# Patient Record
Sex: MALE | Race: WHITE | NOT HISPANIC OR LATINO | Employment: FULL TIME | RURAL
[De-identification: names, ages, dates, MRNs, and addresses within clinical notes are randomized per-mention and may not be internally consistent; named-entity substitution may affect disease eponyms.]

---

## 2020-08-25 ENCOUNTER — HISTORICAL (OUTPATIENT)
Dept: ADMINISTRATIVE | Facility: HOSPITAL | Age: 75
End: 2020-08-25

## 2020-08-25 LAB
GLUCOSE SERPL-MCNC: 103 MG/DL (ref 70–105)
GLUCOSE SERPL-MCNC: 97 MG/DL (ref 70–105)

## 2021-01-11 ENCOUNTER — HISTORICAL (OUTPATIENT)
Dept: ADMINISTRATIVE | Facility: HOSPITAL | Age: 76
End: 2021-01-11

## 2021-09-29 ENCOUNTER — OFFICE VISIT (OUTPATIENT)
Dept: UROLOGY | Facility: CLINIC | Age: 76
End: 2021-09-29
Payer: MEDICARE

## 2021-09-29 VITALS
WEIGHT: 211 LBS | SYSTOLIC BLOOD PRESSURE: 130 MMHG | DIASTOLIC BLOOD PRESSURE: 71 MMHG | HEART RATE: 74 BPM | HEIGHT: 72 IN | BODY MASS INDEX: 28.58 KG/M2

## 2021-09-29 DIAGNOSIS — N32.0 BLADDER OUTLET OBSTRUCTION: Primary | ICD-10-CM

## 2021-09-29 DIAGNOSIS — N13.8 BENIGN PROSTATIC HYPERPLASIA WITH URINARY OBSTRUCTION: ICD-10-CM

## 2021-09-29 DIAGNOSIS — N41.1 CHRONIC PROSTATITIS: ICD-10-CM

## 2021-09-29 DIAGNOSIS — N40.1 BENIGN PROSTATIC HYPERPLASIA WITH URINARY OBSTRUCTION: ICD-10-CM

## 2021-09-29 DIAGNOSIS — Z12.5 SCREENING PSA (PROSTATE SPECIFIC ANTIGEN): ICD-10-CM

## 2021-09-29 PROCEDURE — 99213 OFFICE O/P EST LOW 20 MIN: CPT | Mod: S$PBB,,, | Performed by: UROLOGY

## 2021-09-29 PROCEDURE — 99213 PR OFFICE/OUTPT VISIT, EST, LEVL III, 20-29 MIN: ICD-10-PCS | Mod: S$PBB,,, | Performed by: UROLOGY

## 2021-09-29 PROCEDURE — 99214 OFFICE O/P EST MOD 30 MIN: CPT | Mod: PBBFAC | Performed by: UROLOGY

## 2021-09-29 RX ORDER — TAMSULOSIN HYDROCHLORIDE 0.4 MG/1
0.4 CAPSULE ORAL EVERY 12 HOURS
Qty: 180 CAPSULE | Refills: 3 | Status: SHIPPED | OUTPATIENT
Start: 2021-09-29 | End: 2022-09-30 | Stop reason: SDUPTHER

## 2021-09-29 RX ORDER — LISINOPRIL 20 MG/1
20 TABLET ORAL DAILY
COMMUNITY
Start: 2021-08-05

## 2021-09-29 RX ORDER — MELOXICAM 15 MG/1
15 TABLET ORAL DAILY
COMMUNITY
Start: 2021-05-10

## 2021-09-29 RX ORDER — FINASTERIDE 5 MG/1
5 TABLET, FILM COATED ORAL DAILY
Qty: 90 TABLET | Refills: 3 | Status: SHIPPED | OUTPATIENT
Start: 2021-09-29 | End: 2022-03-07

## 2021-09-29 RX ORDER — TAMSULOSIN HYDROCHLORIDE 0.4 MG/1
1 CAPSULE ORAL DAILY
COMMUNITY
Start: 2021-06-22 | End: 2021-09-29 | Stop reason: SDUPTHER

## 2021-09-29 RX ORDER — FINASTERIDE 5 MG/1
5 TABLET, FILM COATED ORAL DAILY
COMMUNITY
Start: 2021-08-05 | End: 2021-09-29 | Stop reason: SDUPTHER

## 2021-09-29 RX ORDER — METFORMIN HYDROCHLORIDE 1000 MG/1
1000 TABLET ORAL 2 TIMES DAILY
COMMUNITY
Start: 2021-06-22

## 2021-09-29 RX ORDER — PRAVASTATIN SODIUM 80 MG/1
40 TABLET ORAL DAILY
COMMUNITY
Start: 2021-07-20

## 2021-09-29 RX ORDER — FLUCONAZOLE 150 MG/1
150 TABLET ORAL
COMMUNITY
Start: 2021-08-01

## 2021-09-29 RX ORDER — ALLOPURINOL 300 MG/1
300 TABLET ORAL DAILY
COMMUNITY
Start: 2021-08-30

## 2021-10-18 ENCOUNTER — HOSPITAL ENCOUNTER (OUTPATIENT)
Dept: RADIOLOGY | Facility: HOSPITAL | Age: 76
Discharge: HOME | End: 2021-10-18
Attending: ORTHOPAEDIC SURGERY
Payer: MEDICARE

## 2021-10-18 DIAGNOSIS — M25.512 LEFT SHOULDER PAIN, UNSPECIFIED CHRONICITY: ICD-10-CM

## 2021-10-18 PROBLEM — M75.42 IMPINGEMENT SYNDROME OF LEFT SHOULDER: Status: ACTIVE | Noted: 2021-10-18

## 2021-10-18 PROCEDURE — 73030 X-RAY EXAM OF SHOULDER: CPT | Mod: TC,LT

## 2022-03-29 ENCOUNTER — OFFICE VISIT (OUTPATIENT)
Dept: UROLOGY | Facility: CLINIC | Age: 77
End: 2022-03-29
Payer: MEDICARE

## 2022-03-29 VITALS
DIASTOLIC BLOOD PRESSURE: 88 MMHG | SYSTOLIC BLOOD PRESSURE: 161 MMHG | WEIGHT: 220 LBS | BODY MASS INDEX: 29.8 KG/M2 | HEART RATE: 70 BPM | HEIGHT: 72 IN

## 2022-03-29 DIAGNOSIS — N41.1 CHRONIC PROSTATITIS: ICD-10-CM

## 2022-03-29 DIAGNOSIS — N40.1 BENIGN PROSTATIC HYPERPLASIA WITH URINARY OBSTRUCTION: Primary | ICD-10-CM

## 2022-03-29 DIAGNOSIS — N13.8 BENIGN PROSTATIC HYPERPLASIA WITH URINARY OBSTRUCTION: Primary | ICD-10-CM

## 2022-03-29 DIAGNOSIS — Z12.5 SCREENING FOR PROSTATE CANCER: ICD-10-CM

## 2022-03-29 DIAGNOSIS — N47.6 BALANOPOSTHITIS: ICD-10-CM

## 2022-03-29 DIAGNOSIS — N32.0 BLADDER OUTLET OBSTRUCTION: ICD-10-CM

## 2022-03-29 PROCEDURE — 99213 OFFICE O/P EST LOW 20 MIN: CPT | Mod: S$PBB,,, | Performed by: UROLOGY

## 2022-03-29 PROCEDURE — 99214 OFFICE O/P EST MOD 30 MIN: CPT | Mod: PBBFAC | Performed by: UROLOGY

## 2022-03-29 PROCEDURE — 99213 PR OFFICE/OUTPT VISIT, EST, LEVL III, 20-29 MIN: ICD-10-PCS | Mod: S$PBB,,, | Performed by: UROLOGY

## 2022-03-29 NOTE — PROGRESS NOTES
Subjective:       Patient ID: Giorgio Handy is a 77 y.o. male.    Chief Complaint: Follow-up (6 month visit with 1 year PSA screening)    History of Present Illness  I Received a phone call from Dr. Soto Lyman from the [facility] emergency room yesterday about Mr. Handy. He was seen as a work-in patient because of urinary retention that occurred yesterday. He had over 1000 mL of urine in his bladder when  he was catheterized in the emergency room and is wearing an indwelling Beard now. I have not seen Mr. Handy in over 3 years. His last visit here was January 3, 2011. Patient was sent to me by Dr. Abreu in 2009 for bladder outlet obstructive symptoms and gross hematuria. He had a history of nephrolithiasis. He had back problems as a young adult. 3 back surgeries. Patient had a workup that showed a moderately enlarged prostate with a little bit of middle lobe enlargement on September 16, 2009. I thought he did have some neurovesical dysfunction also as well as an enlarged prostate. Patient was having more difficulty voiding when I last saw him in January 2011 and I switched him from Flomax to Svitlana. It is unclear if this was helpful or not but I suspect he stopped it because of the expense. He does not remember. He is back on Flomax now and has been on this for several years. He had not taken any over-the-counter cold medicines or any other medicine that might have triggered him to have urinary retention. He was going about his normal routine when it occurred suddenly. He had not had worsening of bladder outlet obstruction. He has had nothing suggestive of infection etc. He has had increased bleeding through the urinary tract again. He has had some problems for the last few  weeks and I think that enlarged prostate and chronic prostatitis are probably responsible for that. Urine has been blood tinged since the catheter was inserted yesterday.   Patient had arthroscopic knee surgery last year and did not require wearing  a catheter at that time. His last back operation was in 1983. In recent years he considers nocturia 3 or 4 times nightly to be normal. His IPSS score totals 34 however. Patient has been getting his PSAs at Dr. Abreu's office and apparently all of these have been okay although I don't have any of these in our records. We don't know what his PSA has run historically either.   (August 1, 2014)     Mr. Handy is significantly better than he was on the above visit. His nocturia is down to one time nightly where as it was 3-4 times nightly. He had no trouble voiding after the catheter was removed and is still on  Avodart plus Flomax twice a day. Tolerating well but would like to have something cheaper than the Avodart. We will switch to finasteride now as he has been on Avodart for 2 months.  (October 6, 2014)     Mr. Handy was placed on finasteride last time but had switch back to Avodart in December because he thought he did not do as well. We sent this in because he thought he was not doing any better. He continues to have problems with voiding. He has urgency of urination with nocturia 2 or 3 times nightly. He thinks he may be having some less daytime frequency since switching back to Avodart but still has a major problem with bladder emptying. Probably needs to have full workup to see what else needs to be done as he is on maximum pharmacologic regimen.  (February 10, 2015)     Mr. Handy returned today for microwave thermotherapy as previously planned. His wife has reviewed information and he has read some of it. Questions answered. He wanted to proceed. Voiding is stable. (March 17, 2015)     Mr. Handy had microwave thermotherapy on the above date. He pulled his catheter this morning for voiding trial, but called and said he was having bleeding. We advised him to come to the office because he was not sure if he was emptying his bladder or not. He has been voiding in relatively small amounts with some clots. (March  20, 2015)     Patient pulled catheter again yesterday as instructed. He voided fairly well for several hours then started having more trouble late in the day yesterday with increased frequency and came in today because of marked frequency and urgency. Urine has been reasonably clear. Patient slightly uncomfortable on arrival, and it is felt that he needs to have another catheter for another few days.  (March 25, 2015)     Mr. Handy is now slightly over one month post microwave thermotherapy. He pulled his catheter again on March 31 and has been voiding since. He went to see Dr. Abreu last Friday and was cultured and placed on Cipro which he is still on. He saw a little blood pass on Monday but none since. He has nocturia 2 or 3 times nightly. He feels he is voiding in smaller amounts in the daytime than he does at night. He can tell he is making significant progress. He has had no fever but he has had burning on urination.  (April 22, 2015)     Mr. Handy is now a little over 3 months post microwave thermotherapy. This was done March 17 . He has improved with his voiding. He is on Flomax twice a day and Avodart once daily. Voiding is reasonably good and he has a good stream now. He does have urgency at times. He has nocturia only one time nightly. No new problems. Plans a trip to his grandson's wedding next month in [place].  (June 24, 2015)     Mr. Handy is in for six-month follow-up visit. Voiding okay now and his urgency problems are finally better. His microwave thermotherapy was March 17, 2015. He is still on Flomax twice daily. He is also on finasteride daily. Doing better and seems to be satisfied that things are doing well currently from  standpoint.  (January 6, 2016)     Mr. Handy is in for six-month checkup. He is taking one tamsulosin nightly now, and he is off finasteride and the other Flomax. He is doing okay with voiding and has nocturia only one time nightly. He also had left carpal tunnel surgery  but had waited a few years and has not had good results from that. He has doing reasonably well from  standpoint. He had a PSA in January of 1.08 which is perfectly normal.  (July 18, 2016)     Mr. Handy is in for one year follow-up visit and is doing satisfactory with voiding. Still having nocturia  no more than one time nightly. No new problems, and stable from  standpoint. His PSA done in [place] on May 31st was reported as being immeasurable at less than 0.13. That is suspicious that this is erroneously low as it is much lower than previous ones, but at least it is unlikely that there would be significant elevation. I do not see a reason to repeat PSA this year and just have another one next year. (July 25, 2017)     Mr. Handy is in for his first visit in over a year. He had an umbilical hernia repair about 8 weeks ago. Otherwise uneventful year. He is stable with voiding with nocturia typically one time nightly. He does have some postvoiding dribbling that's aggravating but no other major issues. He had a cardiac evaluation by Dr. Martins after he had some chest pain but workup was negative.  (October 1, 2018)     Mr. Handy is an for first visit in one year. Recent right shoulder surgery. Voiding fairly well except on prolonged trips when he rides a long way slows his urine down. Typically nocturia x2. PSA done and pending. (November 14, 2019)     Mr. Handy is in for screening. Having increasing difficulty with voiding. He has nocturia 3-5 times nightly with occasional urge incontinence. Steadily worsened since his last visit. He is taking his medications regularly. Also concerned he might have a stone because he is having left flank pain that has been going on for 2 weeks, but worse for the last few days. Previous lithotripsy done in late 1990s. No stones recently and that he is aware of. No visible blood in urine. (January 11,  2021)  ------------------------------------------------------------------------------------------------------------------------------------------------------------------------------------------------------------     PSA was 2.730 on January 11, 2021  PSA was 3.830 on November 14, 2019  PSA was 2.580 on October 1, 2018  PSA was 1.020 on January 13, 2016  2.36 on 8/30/2013  ----------------------------------------------------------------------------------------  -------------------------------------------------------------------------------------------------------------------------------------------------------------------------------------------------------------------------------------------------------------------------------------------  The above notes are notes from the old electronic medical record    PSA was 1.380 on March 29, 2022     Mr. Handy is in for his follow-up visit.  He was supposed to get PSA but did not get it done and I think it will be fine to skip this time.  He may be some better than he was and he has nocturia only 1 time nightly now typically.  He has some problem if he has to wait to void.  Has had no recent stone problems.  He is on maximum pharmacologic management of his bladder outlet obstruction taking the tamsulosin b.i.d. and finasteride once daily.  He has had no new health issues since last visit. (September 29, 2021)         Review of Systems      Objective:      Physical Exam  Constitutional:       Appearance: Normal appearance. He is normal weight.   Genitourinary:     Prostate: Normal.      Rectum: Normal.      Comments: Prostate 30  Grams benign  Neurological:      Mental Status: He is alert.   Psychiatric:         Mood and Affect: Mood normal.         Behavior: Behavior normal.       urinalysis revealed occasional pus cells.  The dipstick negative with pH 5.0 and specific gravity 1.025  Assessment:       Problem List Items Addressed This Visit    None     Visit Diagnoses      Benign prostatic hyperplasia with urinary obstruction    -  Primary    Bladder outlet obstruction        Balanoposthitis        Chronic prostatitis        Screening for prostate cancer              Plan:     Patient continues to have bladder outlet obstruction and might need to do something else.  He does not want to proceed with anything else at this time.  He did have previous microwave thermotherapy that helped only minimally for a while.  He will continue on the same pharmacologic management.  Patient also having some trouble with his foreskin and may need to consider having circumcision at some time.  He had PSA done on his way out of the building and is perfectly acceptable at 1.380.  Six month appointment or sooner if needed.  *

## 2022-03-30 NOTE — PATIENT INSTRUCTIONS
Patient continues to have bladder outlet obstruction and might need to do something else.  He does not want to proceed with anything else at this time.  He did have previous microwave thermotherapy that helped only minimally for a while.  He will continue on the same pharmacologic management.  Patient also having some trouble with his foreskin and may need to consider having circumcision at some time.  He had PSA done on his way out of the building and is perfectly acceptable at 1.380.  Six month appointment or sooner if needed.

## 2022-06-15 ENCOUNTER — HOSPITAL ENCOUNTER (OUTPATIENT)
Dept: RADIOLOGY | Facility: HOSPITAL | Age: 77
Discharge: HOME | End: 2022-06-15
Attending: ORTHOPAEDIC SURGERY
Payer: MEDICARE

## 2022-06-15 DIAGNOSIS — M25.561 RIGHT KNEE PAIN, UNSPECIFIED CHRONICITY: ICD-10-CM

## 2022-06-15 PROBLEM — M70.41 PREPATELLAR BURSITIS OF RIGHT KNEE: Status: ACTIVE | Noted: 2022-06-15

## 2022-06-15 PROCEDURE — 73564 X-RAY EXAM KNEE 4 OR MORE: CPT | Mod: TC,RT

## 2022-06-29 NOTE — H&P (VIEW-ONLY)
Department of Orthopedic Surgery    History and Physical         HISTORY:  77-year-old male with a right knee prepatellar bursitis needing excision the prepatellar bursa    PAST MEDICAL HISTORY:   Past Medical History:   Diagnosis Date    BNO (bladder neck obstruction)     BPH with obstruction/lower urinary tract symptoms     Chronic prostatitis     Diabetes mellitus     Hypertension         PAST SURGICAL HISTORY:   Past Surgical History:   Procedure Laterality Date    BACK SURGERY      CARPAL TUNNEL RELEASE Bilateral     KNEE SURGERY Left     SHOULDER SURGERY Right     TRIGGER FINGER RELEASE Right           ALLERGIES: Review of patient's allergies indicates:  No Known Allergies       MEDICATIONS: (Not in a hospital admission)       SOCIAL HISTORY:   Social History     Socioeconomic History    Marital status:      Spouse name: Kaley    Number of children: 3    Highest education level: 12th grade   Tobacco Use    Smoking status: Former Smoker     Quit date:      Years since quittin.5    Smokeless tobacco: Former User     Quit date:    Substance and Sexual Activity    Alcohol use: Never    Drug use: Never    Sexual activity: Not Currently          FAMILY HISTORY:   Family History   Problem Relation Age of Onset    Cancer Sister           PHYSICAL EXAM:   There were no vitals filed for this visit.  There is no height or weight on file to calculate BMI.     In general, this is a well-developed, well-nourished male . The patient is alert, oriented and cooperative.      HEENT:  Normocephalic, atraumatic.  Extraocular movements are intact bilaterally.  The oropharynx is benign.       NECK:  Nontender with good range of motion.      LUNGS:  Clear to auscultation bilaterally.      HEART:  Demonstrates a regular rate and rhythm.  No murmurs appreciated.      ABDOMEN:  Soft, non-tender, non-distended.        EXTREMITIES:  Right lower extremity moves his toes has sensation to touch has  palpable pulses over the anterior aspect of his patella he has a 6 x 4 cm firm bursitis that is tender to palpation he has full motion in the knee      RADIOGRAPHIC FINDINGS:  X-rays show soft tissue swelling over the anterior aspect of the right knee      IMPRESSION:  No fractures prepatellar bursitis right knee      PLAN:  Excision prepatellar bursa right knee    I had a long discussion with the patient about treatment options, including operative and nonoperative treatments. We discussed pros and cons of each including risks pertinent to surgery including pain, infection, bleeding, damage to adjacent structures like nerves and blood vessels, failure to heal, need for future surgeries, stiffness, instability, loss of limb, anesthesia risks like stroke, blood clot, loss of life. We discussed the possibility of need for later hardware removal in the case that hardware was used. We discussed common and uncommon risks, and discussed patient specific factors that may increase the risks present with surgery. All questions were answered. The patient expressed understanding of the pros and cons of surgery and wanted to proceed with surgical treatment.        (Subject to voice recognition error, transcription service not allowed)

## 2022-06-30 ENCOUNTER — HOSPITAL ENCOUNTER (OUTPATIENT)
Facility: HOSPITAL | Age: 77
Discharge: HOME | End: 2022-06-30
Attending: ORTHOPAEDIC SURGERY | Admitting: ORTHOPAEDIC SURGERY
Payer: MEDICARE

## 2022-06-30 ENCOUNTER — ANESTHESIA (OUTPATIENT)
Dept: SURGERY | Facility: HOSPITAL | Age: 77
End: 2022-06-30
Payer: MEDICARE

## 2022-06-30 ENCOUNTER — ANESTHESIA EVENT (OUTPATIENT)
Dept: SURGERY | Facility: HOSPITAL | Age: 77
End: 2022-06-30
Payer: MEDICARE

## 2022-06-30 VITALS
WEIGHT: 204 LBS | OXYGEN SATURATION: 95 % | HEART RATE: 62 BPM | RESPIRATION RATE: 16 BRPM | BODY MASS INDEX: 28.56 KG/M2 | DIASTOLIC BLOOD PRESSURE: 76 MMHG | TEMPERATURE: 97 F | SYSTOLIC BLOOD PRESSURE: 173 MMHG | HEIGHT: 71 IN

## 2022-06-30 DIAGNOSIS — M70.41 PREPATELLAR BURSITIS OF RIGHT KNEE: ICD-10-CM

## 2022-06-30 LAB
GLUCOSE SERPL-MCNC: 82 MG/DL (ref 70–105)
GLUCOSE SERPL-MCNC: 92 MG/DL (ref 70–105)
GLUCOSE SERPL-MCNC: 94 MG/DL (ref 70–105)

## 2022-06-30 PROCEDURE — 25000003 PHARM REV CODE 250: Performed by: NURSE ANESTHETIST, CERTIFIED REGISTERED

## 2022-06-30 PROCEDURE — 27000655: Performed by: NURSE ANESTHETIST, CERTIFIED REGISTERED

## 2022-06-30 PROCEDURE — 36000706: Performed by: ORTHOPAEDIC SURGERY

## 2022-06-30 PROCEDURE — 25000003 PHARM REV CODE 250: Performed by: ORTHOPAEDIC SURGERY

## 2022-06-30 PROCEDURE — D9220A PRA ANESTHESIA: Mod: CRNA,,, | Performed by: NURSE ANESTHETIST, CERTIFIED REGISTERED

## 2022-06-30 PROCEDURE — 71000033 HC RECOVERY, INTIAL HOUR: Performed by: ORTHOPAEDIC SURGERY

## 2022-06-30 PROCEDURE — 63600175 PHARM REV CODE 636 W HCPCS: Performed by: ANESTHESIOLOGY

## 2022-06-30 PROCEDURE — 27000177 HC AIRWAY, LARYNGEAL MASK: Performed by: NURSE ANESTHETIST, CERTIFIED REGISTERED

## 2022-06-30 PROCEDURE — D9220A PRA ANESTHESIA: ICD-10-PCS | Mod: ANES,,, | Performed by: ANESTHESIOLOGY

## 2022-06-30 PROCEDURE — 25000003 PHARM REV CODE 250: Performed by: ANESTHESIOLOGY

## 2022-06-30 PROCEDURE — 27201423 OPTIME MED/SURG SUP & DEVICES STERILE SUPPLY: Performed by: ORTHOPAEDIC SURGERY

## 2022-06-30 PROCEDURE — 71000015 HC POSTOP RECOV 1ST HR: Performed by: ORTHOPAEDIC SURGERY

## 2022-06-30 PROCEDURE — D9220A PRA ANESTHESIA: Mod: ANES,,, | Performed by: ANESTHESIOLOGY

## 2022-06-30 PROCEDURE — 97161 PT EVAL LOW COMPLEX 20 MIN: CPT

## 2022-06-30 PROCEDURE — 36000707: Performed by: ORTHOPAEDIC SURGERY

## 2022-06-30 PROCEDURE — 37000008 HC ANESTHESIA 1ST 15 MINUTES: Performed by: ORTHOPAEDIC SURGERY

## 2022-06-30 PROCEDURE — D9220A PRA ANESTHESIA: ICD-10-PCS | Mod: CRNA,,, | Performed by: NURSE ANESTHETIST, CERTIFIED REGISTERED

## 2022-06-30 PROCEDURE — 82962 GLUCOSE BLOOD TEST: CPT

## 2022-06-30 PROCEDURE — 27000716 HC OXISENSOR PROBE, ANY SIZE: Performed by: NURSE ANESTHETIST, CERTIFIED REGISTERED

## 2022-06-30 PROCEDURE — 37000009 HC ANESTHESIA EA ADD 15 MINS: Performed by: ORTHOPAEDIC SURGERY

## 2022-06-30 PROCEDURE — 63600175 PHARM REV CODE 636 W HCPCS: Performed by: NURSE ANESTHETIST, CERTIFIED REGISTERED

## 2022-06-30 PROCEDURE — 27000510 HC BLANKET BAIR HUGGER ANY SIZE: Performed by: NURSE ANESTHETIST, CERTIFIED REGISTERED

## 2022-06-30 PROCEDURE — 63600175 PHARM REV CODE 636 W HCPCS: Performed by: ORTHOPAEDIC SURGERY

## 2022-06-30 RX ORDER — DIPHENHYDRAMINE HYDROCHLORIDE 50 MG/ML
25 INJECTION INTRAMUSCULAR; INTRAVENOUS EVERY 6 HOURS PRN
Status: DISCONTINUED | OUTPATIENT
Start: 2022-06-30 | End: 2022-06-30 | Stop reason: HOSPADM

## 2022-06-30 RX ORDER — ONDANSETRON 4 MG/1
8 TABLET, ORALLY DISINTEGRATING ORAL EVERY 8 HOURS PRN
Status: DISCONTINUED | OUTPATIENT
Start: 2022-06-30 | End: 2022-06-30 | Stop reason: HOSPADM

## 2022-06-30 RX ORDER — PROPOFOL 10 MG/ML
VIAL (ML) INTRAVENOUS
Status: DISCONTINUED | OUTPATIENT
Start: 2022-06-30 | End: 2022-06-30

## 2022-06-30 RX ORDER — HYDROCODONE BITARTRATE AND ACETAMINOPHEN 5; 325 MG/1; MG/1
1 TABLET ORAL EVERY 6 HOURS PRN
Qty: 28 TABLET | Refills: 0 | Status: SHIPPED | OUTPATIENT
Start: 2022-06-30 | End: 2022-07-07

## 2022-06-30 RX ORDER — PROMETHAZINE HYDROCHLORIDE 25 MG/1
25 TABLET ORAL EVERY 6 HOURS PRN
Status: DISCONTINUED | OUTPATIENT
Start: 2022-06-30 | End: 2022-06-30 | Stop reason: HOSPADM

## 2022-06-30 RX ORDER — MORPHINE SULFATE 8 MG/ML
4 INJECTION INTRAMUSCULAR; INTRAVENOUS; SUBCUTANEOUS EVERY 5 MIN PRN
Status: DISCONTINUED | OUTPATIENT
Start: 2022-06-30 | End: 2022-06-30 | Stop reason: HOSPADM

## 2022-06-30 RX ORDER — CEFAZOLIN SODIUM 2 G/50ML
2 SOLUTION INTRAVENOUS
Status: COMPLETED | OUTPATIENT
Start: 2022-06-30 | End: 2022-06-30

## 2022-06-30 RX ORDER — SODIUM CHLORIDE 9 MG/ML
INJECTION, SOLUTION INTRAVENOUS CONTINUOUS
Status: DISPENSED | OUTPATIENT
Start: 2022-06-30

## 2022-06-30 RX ORDER — FENTANYL CITRATE 50 UG/ML
INJECTION, SOLUTION INTRAMUSCULAR; INTRAVENOUS
Status: DISCONTINUED | OUTPATIENT
Start: 2022-06-30 | End: 2022-06-30

## 2022-06-30 RX ORDER — HYDROMORPHONE HYDROCHLORIDE 2 MG/ML
0.5 INJECTION, SOLUTION INTRAMUSCULAR; INTRAVENOUS; SUBCUTANEOUS EVERY 5 MIN PRN
Status: DISCONTINUED | OUTPATIENT
Start: 2022-06-30 | End: 2022-06-30 | Stop reason: HOSPADM

## 2022-06-30 RX ORDER — HYDROCODONE BITARTRATE AND ACETAMINOPHEN 5; 325 MG/1; MG/1
1 TABLET ORAL EVERY 4 HOURS PRN
Status: DISCONTINUED | OUTPATIENT
Start: 2022-06-30 | End: 2022-06-30 | Stop reason: HOSPADM

## 2022-06-30 RX ORDER — OXYCODONE HYDROCHLORIDE 5 MG/1
5 TABLET ORAL
Status: DISCONTINUED | OUTPATIENT
Start: 2022-06-30 | End: 2022-06-30 | Stop reason: HOSPADM

## 2022-06-30 RX ORDER — ONDANSETRON 2 MG/ML
4 INJECTION INTRAMUSCULAR; INTRAVENOUS DAILY PRN
Status: DISCONTINUED | OUTPATIENT
Start: 2022-06-30 | End: 2022-06-30 | Stop reason: HOSPADM

## 2022-06-30 RX ORDER — ACETAMINOPHEN 500 MG
1000 TABLET ORAL EVERY 6 HOURS PRN
Status: DISCONTINUED | OUTPATIENT
Start: 2022-06-30 | End: 2022-06-30 | Stop reason: HOSPADM

## 2022-06-30 RX ORDER — HYDROCODONE BITARTRATE AND ACETAMINOPHEN 10; 325 MG/1; MG/1
1 TABLET ORAL EVERY 4 HOURS PRN
Status: DISCONTINUED | OUTPATIENT
Start: 2022-06-30 | End: 2022-06-30 | Stop reason: HOSPADM

## 2022-06-30 RX ORDER — LIDOCAINE HYDROCHLORIDE 20 MG/ML
INJECTION, SOLUTION EPIDURAL; INFILTRATION; INTRACAUDAL; PERINEURAL
Status: DISCONTINUED | OUTPATIENT
Start: 2022-06-30 | End: 2022-06-30

## 2022-06-30 RX ORDER — MEPERIDINE HYDROCHLORIDE 25 MG/ML
25 INJECTION INTRAMUSCULAR; INTRAVENOUS; SUBCUTANEOUS EVERY 10 MIN PRN
Status: DISCONTINUED | OUTPATIENT
Start: 2022-06-30 | End: 2022-06-30 | Stop reason: HOSPADM

## 2022-06-30 RX ADMIN — HYDROMORPHONE HYDROCHLORIDE 0.5 MG: 2 INJECTION, SOLUTION INTRAMUSCULAR; INTRAVENOUS; SUBCUTANEOUS at 04:06

## 2022-06-30 RX ADMIN — ONDANSETRON 8 MG: 4 TABLET, ORALLY DISINTEGRATING ORAL at 05:06

## 2022-06-30 RX ADMIN — LIDOCAINE HYDROCHLORIDE 50 MG: 20 INJECTION, SOLUTION EPIDURAL; INFILTRATION; INTRACAUDAL; PERINEURAL at 03:06

## 2022-06-30 RX ADMIN — CEFAZOLIN SODIUM 2 G: 1 INJECTION, POWDER, FOR SOLUTION INTRAMUSCULAR; INTRAVENOUS at 03:06

## 2022-06-30 RX ADMIN — SODIUM CHLORIDE: 9 INJECTION, SOLUTION INTRAVENOUS at 03:06

## 2022-06-30 RX ADMIN — FENTANYL CITRATE 50 MCG: 50 INJECTION INTRAMUSCULAR; INTRAVENOUS at 03:06

## 2022-06-30 RX ADMIN — OXYCODONE HYDROCHLORIDE 5 MG: 5 TABLET ORAL at 05:06

## 2022-06-30 RX ADMIN — PROPOFOL 150 MG: 10 INJECTION, EMULSION INTRAVENOUS at 03:06

## 2022-06-30 NOTE — ANESTHESIA PREPROCEDURE EVALUATION
06/30/2022  Giorgio Handy is a 77 y.o., male.      Pre-op Assessment    I have reviewed the Patient Summary Reports.    I have reviewed the NPO Status.   I have reviewed the Medications.     Review of Systems  Social:  Non-Smoker, No Alcohol Use    Hematology/Oncology:  Hematology Normal   Oncology Normal     EENT/Dental:EENT/Dental Normal   Cardiovascular:   Hypertension hyperlipidemia ECG has been reviewed.    Pulmonary:  Pulmonary Normal    Renal/:  Renal/ Normal     Hepatic/GI:  Hepatic/GI Normal    Musculoskeletal:  Musculoskeletal Normal    Neurological:  Neurology Normal    Endocrine:   Diabetes    Dermatological:  Skin Normal    Psych:  Psychiatric Normal           Physical Exam  General: Well nourished, Cooperative, Alert and Oriented    Airway:  Mallampati: II / II  Mouth Opening: Normal  TM Distance: Normal  Neck ROM: Normal ROM    Dental:  Dentures    Chest/Lungs:  Clear to auscultation    Heart:  Rate: Normal  Rhythm: Regular Rhythm  Sounds: Normal        Chemistry        Component Value Date/Time    GLU 97 08/25/2020 1317    No results found for: CALCIUM, ALKPHOS, AST, ALT, BILITOT, ESTGFRAFRICA, EGFRNONAA   No results found for: WBC, RBC, HGB, MCV, MCH, MCHC, RDW, PLT, MPV, GRAN, LYMPH, MONO, EOS, BASO  No results found for this or any previous visit.      Anesthesia Plan  Type of Anesthesia, risks & benefits discussed:    Anesthesia Type: Gen Supraglottic Airway  Intra-op Monitoring Plan: Standard ASA Monitors  Post Op Pain Control Plan: multimodal analgesia  Induction:  IV  Airway Plan: Direct  Informed Consent: Informed consent signed with the Patient and all parties understand the risks and agree with anesthesia plan.  All questions answered.   ASA Score: 3  Day of Surgery Review of History & Physical: H&P Update referred to the surgeon/provider.    Ready For Surgery From Anesthesia  Perspective.     .

## 2022-06-30 NOTE — ANESTHESIA POSTPROCEDURE EVALUATION
Anesthesia Post Evaluation    Patient: Giorgio Handy    Procedure(s) Performed: Procedure(s) (LRB):  EXCISION, MASS, LOWER EXTREMITY prepatellar bursa right knee (Right)    Final Anesthesia Type: general      Patient location during evaluation: PACU  Patient participation: Yes- Able to Participate  Level of consciousness: awake and sedated  Post-procedure vital signs: reviewed and stable  Pain management: adequate  Airway patency: patent    PONV status at discharge: No PONV  Anesthetic complications: no      Cardiovascular status: blood pressure returned to baseline  Respiratory status: unassisted  Hydration status: euvolemic  Follow-up not needed.          Vitals Value Taken Time   /66 06/30/22 1616   Temp 36.4 °C (97.6 °F) 06/30/22 1616   Pulse 56 06/30/22 1616   Resp 15 06/30/22 1616   SpO2 95 % 06/30/22 1616         No case tracking events are documented in the log.      Pain/Tasneem Score: No data recorded

## 2022-06-30 NOTE — ANESTHESIA PROCEDURE NOTES
Intubation    Date/Time: 6/30/2022 3:16 PM  Performed by: Artem Da Silva CRNA  Authorized by: Artem Da Silva CRNA     Intubation:     Induction:  Intravenous    Intubated:  Postinduction    Mask Ventilation:  Easy mask    Attempts:  1    Attempted By:  CRNA    Method of Intubation:  Direct    Difficult Airway Encountered?: No      Complications:  None    Airway Device:  Supraglottic airway/LMA    Airway Device Size:  4.0    Style/Cuff Inflation:  Cuffed (inflated to minimal occlusive pressure)    Placement Verified By:  Capnometry    Complicating Factors:  None    Findings Post-Intubation:  BS equal bilateral and atraumatic/condition of teeth unchanged

## 2022-06-30 NOTE — INTERVAL H&P NOTE
The patient has been examined and the H&P has been reviewed:    I concur with the findings and no changes have occurred since H&P was written.    Surgery risks, benefits and alternative options discussed and understood by patient/family.          Active Hospital Problems    Diagnosis  POA    *Prepatellar bursitis of right knee [M70.41]  Yes      Resolved Hospital Problems   No resolved problems to display.

## 2022-06-30 NOTE — TRANSFER OF CARE
"Anesthesia Transfer of Care Note    Patient: Giorgio Handy    Procedure(s) Performed: Procedure(s) (LRB):  EXCISION, MASS, LOWER EXTREMITY prepatellar bursa right knee (Right)    Patient location: PACU    Anesthesia Type: general    Transport from OR: Transported from OR on room air with adequate spontaneous ventilation    Post pain: adequate analgesia    Post assessment: no apparent anesthetic complications    Post vital signs: stable    Level of consciousness: responds to stimulation, awake and sedated    Nausea/Vomiting: no nausea/vomiting    Complications: none    Transfer of care protocol was followed      Last vitals:   Visit Vitals  /66 (BP Location: Right arm, Patient Position: Lying)   Pulse (!) 56   Temp 36.4 °C (97.6 °F) (Oral)   Resp 15   Ht 5' 11" (1.803 m)   Wt 92.5 kg (204 lb)   SpO2 95%   BMI 28.45 kg/m²     "

## 2022-06-30 NOTE — OP NOTE
[facility] - Orthopedic Periop Services  General Surgery  Operative Note    SUMMARY     Date of Procedure: 6/30/2022     Procedure: Procedure(s) (LRB):  EXCISION, MASS, LOWER EXTREMITY prepatellar bursa right knee (Right)       Surgeon(s) and Role:     * Derek Blanc MD - Primary    Assisting Surgeon: None    Pre-Operative Diagnosis: Acute pain of right knee [M25.561]  Prepatellar bursitis of right knee [M70.41]    Post-Operative Diagnosis: Post-Op Diagnosis Codes:     * Acute pain of right knee [M25.561]     * Prepatellar bursitis of right knee [M70.41]    Anesthesia: Choice    Operative Findings (including complications, if any):  After risks and benefits were explained at length the patient stating understands risks benefits wished to proceed with the procedure informed consent was obtained patient was taken operating room placed in supine position on operative table which time anesthesia was induced per Anesthesia.  At this time his right lower extremity was prepped and draped sterile fashion a tourniquet over cast padding on proximal right thigh.  At this time once leg was prepped draped sterile fashion leg was elevated tourniquet inflated 250 mmHg.  A 8 cm incision was made over the anterior aspect of the prepatellar bursa on the knee is made skin 15. Blade.  Careful dissection down to a thickened prepatellar bursa was performed using pickups and scissors hemostasis maintained Bovie electrocautery.  The bursa was then dissected free of the soft tissue and sent to pathology tourniquet let down hemostasis maintained Bovie electrocautery wound irrigated out copious amounts normal saline subcuticular 2-0 Vicryl followed by skin staples sterile occlusive dressing placed patient was awakened taken recovery in good condition all counts correct no complications tourniquet was up for 20 minutes    Description of Technical Procedures:     Significant Surgical Tasks Conducted by the Assistant(s), if Applicable:      Estimated Blood Loss (EBL): 10 mL           Implants: * No implants in log *    Specimens:   Specimen (24h ago, onward)             Start     Ordered    06/30/22 1555  Surgical Pathology  Once        Question Answer Comment   Number of specimens 1    Source(s): Knee, Right    Clinical History: BURSA RIGHT KNEE        06/30/22 1555                        Condition: Good    Disposition: PACU - hemodynamically stable.    Attestation: I was present and scrubbed for the entire procedure.

## 2022-07-01 NOTE — PT/OT/SLP EVAL
Physical Therapy Evaluation    Patient Name:  Giorgio Handy   MRN:  [id number]    Recommendations:     Discharge Recommendations:      Discharge Equipment Recommendations: crutches   Barriers to discharge: None    Assessment:     Giorgio Handy is a 77 y.o. male admitted with a medical diagnosis of Prepatellar bursitis of right knee.  He presents with the following impairments/functional limitations:    Patient with good understanding of post op education.    Rehab Prognosis: Good; patient would benefit from acute skilled PT services to address these deficits and reach maximum level of function.    Recent Surgery: Procedure(s) (LRB):  EXCISION, MASS, LOWER EXTREMITY prepatellar bursa right knee (Right) Day of Surgery    Plan:     During this hospitalization, patient to be seen   to address the identified rehab impairments via gait training, therapeutic activities and progress toward the following goals:    · Plan of Care Expires:  06/30/22    Subjective     Chief Complaint: none  Patient/Family Comments/goals:   Pain/Comfort:  · Pain Rating 1: 3/10  · Location - Side 1: Right  · Location 1: knee  · Pain Addressed 1: Pre-medicate for activity    Patients cultural, spiritual, Scientology conflicts given the current situation: no    Living Environment:  Lives with spouse  Prior to admission, patients level of function was independent.  Equipment used at home: none.  DME owned (not currently used): none.  Upon discharge, patient will have assistance from family.    Objective:     Communicated with nurse prior to session.  Patient found supine with    upon PT entry to room.    General Precautions: Standard, fall   Orthopedic Precautions:RLE weight bearing as tolerated   Braces:    Respiratory Status: Room air    Exams:  · na    Functional Mobility:  · Gait: ambulated 20 feet with crutches wbat    Therapeutic Activities and Exercises:   hep ascope protocol    AM-PAC 6 CLICK MOBILITY  Total Score:24     Patient left supine with  call button in reach.    GOALS:   Multidisciplinary Problems     Physical Therapy Goals     Not on file          Multidisciplinary Problems (Resolved)        Problem: Physical Therapy    Goal Priority Disciplines Outcome Goal Variances Interventions   Physical Therapy Goal   (Resolved)     PT, PT/OT Met     Description: Short Term Goals  Independent with HEP  Independent with crutchesx 10 feet FWB/WBAT: right lower extremity    Long term goals  Needed equipment for home.                        History:     Past Medical History:   Diagnosis Date    BNO (bladder neck obstruction)     BPH with obstruction/lower urinary tract symptoms     Chronic prostatitis     Diabetes mellitus     Hypertension        Past Surgical History:   Procedure Laterality Date    BACK SURGERY      CARPAL TUNNEL RELEASE Bilateral     KNEE SURGERY Left     SHOULDER SURGERY Right     TRIGGER FINGER RELEASE Right        Time Tracking:     PT Received On: 06/30/22  PT Start Time: 1700     PT Stop Time: 1725  PT Total Time (min): 25 min     Billable Minutes: Evaluation 20       06/30/2022

## 2022-07-01 NOTE — PLAN OF CARE
Problem: Physical Therapy  Goal: Physical Therapy Goal  Description: Short Term Goals  Independent with HEP  Independent with crutchesx 10 feet FWB/WBAT: right lower extremity    Long term goals  Needed equipment for home.       Outcome: Met

## 2022-07-05 LAB
DHEA SERPL-MCNC: NORMAL
ESTROGEN SERPL-MCNC: NORMAL PG/ML
INSULIN SERPL-ACNC: NORMAL U[IU]/ML
LAB AP CLINICAL INFORMATION: NORMAL
LAB AP GROSS DESCRIPTION: NORMAL
LAB AP LABORATORY NOTES: NORMAL
T3RU NFR SERPL: NORMAL %

## 2022-09-29 ENCOUNTER — OFFICE VISIT (OUTPATIENT)
Dept: UROLOGY | Facility: CLINIC | Age: 77
End: 2022-09-29
Payer: MEDICARE

## 2022-09-29 VITALS
BODY MASS INDEX: 28.58 KG/M2 | DIASTOLIC BLOOD PRESSURE: 74 MMHG | SYSTOLIC BLOOD PRESSURE: 129 MMHG | HEIGHT: 72 IN | HEART RATE: 70 BPM | WEIGHT: 211 LBS

## 2022-09-29 DIAGNOSIS — N32.0 BLADDER OUTLET OBSTRUCTION: ICD-10-CM

## 2022-09-29 DIAGNOSIS — N13.8 BENIGN PROSTATIC HYPERPLASIA WITH URINARY OBSTRUCTION: Primary | ICD-10-CM

## 2022-09-29 DIAGNOSIS — Z12.5 SCREENING FOR PROSTATE CANCER: ICD-10-CM

## 2022-09-29 DIAGNOSIS — N40.1 BENIGN PROSTATIC HYPERPLASIA WITH URINARY OBSTRUCTION: Primary | ICD-10-CM

## 2022-09-29 DIAGNOSIS — N41.1 CHRONIC PROSTATITIS: ICD-10-CM

## 2022-09-29 PROCEDURE — 99213 OFFICE O/P EST LOW 20 MIN: CPT | Mod: S$PBB,,, | Performed by: UROLOGY

## 2022-09-29 PROCEDURE — 99214 OFFICE O/P EST MOD 30 MIN: CPT | Mod: PBBFAC | Performed by: UROLOGY

## 2022-09-29 PROCEDURE — 99213 PR OFFICE/OUTPT VISIT, EST, LEVL III, 20-29 MIN: ICD-10-PCS | Mod: S$PBB,,, | Performed by: UROLOGY

## 2022-09-29 NOTE — PROGRESS NOTES
Subjective:       Patient ID: Giorgio Handy is a 77 y.o. male.    Chief Complaint: Follow-up (6 month visit)       History of Present Illness  I Received a phone call from Dr. Soto Lyman from the [facility] emergency room yesterday about Mr. Handy. He was seen as a work-in patient because of urinary retention that occurred yesterday. He had over 1000 mL of urine in his bladder when  he was catheterized in the emergency room and is wearing an indwelling Beard now. I have not seen Mr. Handy in over 3 years. His last visit here was January 3, 2011. Patient was sent to me by Dr. Abreu in 2009 for bladder outlet obstructive symptoms and gross hematuria. He had a history of nephrolithiasis. He had back problems as a young adult. 3 back surgeries. Patient had a workup that showed a moderately enlarged prostate with a little bit of middle lobe enlargement on September 16, 2009. I thought he did have some neurovesical dysfunction also as well as an enlarged prostate. Patient was having more difficulty voiding when I last saw him in January 2011 and I switched him from Flomax to Svitlana. It is unclear if this was helpful or not but I suspect he stopped it because of the expense. He does not remember. He is back on Flomax now and has been on this for several years. He had not taken any over-the-counter cold medicines or any other medicine that might have triggered him to have urinary retention. He was going about his normal routine when it occurred suddenly. He had not had worsening of bladder outlet obstruction. He has had nothing suggestive of infection etc. He has had increased bleeding through the urinary tract again. He has had some problems for the last few  weeks and I think that enlarged prostate and chronic prostatitis are probably responsible for that. Urine has been blood tinged since the catheter was inserted yesterday.   Patient had arthroscopic knee surgery last year and did not require wearing a catheter at that  time. His last back operation was in 1983. In recent years he considers nocturia 3 or 4 times nightly to be normal. His IPSS score totals 34 however. Patient has been getting his PSAs at Dr. Abreu's office and apparently all of these have been okay although I don't have any of these in our records. We don't know what his PSA has run historically either.   (August 1, 2014)     Mr. Handy is significantly better than he was on the above visit. His nocturia is down to one time nightly where as it was 3-4 times nightly. He had no trouble voiding after the catheter was removed and is still on  Avodart plus Flomax twice a day. Tolerating well but would like to have something cheaper than the Avodart. We will switch to finasteride now as he has been on Avodart for 2 months.  (October 6, 2014)     Mr. Handy was placed on finasteride last time but had switch back to Avodart in December because he thought he did not do as well. We sent this in because he thought he was not doing any better. He continues to have problems with voiding. He has urgency of urination with nocturia 2 or 3 times nightly. He thinks he may be having some less daytime frequency since switching back to Avodart but still has a major problem with bladder emptying. Probably needs to have full workup to see what else needs to be done as he is on maximum pharmacologic regimen.  (February 10, 2015)     Mr. Handy returned today for microwave thermotherapy as previously planned. His wife has reviewed information and he has read some of it. Questions answered. He wanted to proceed. Voiding is stable. (March 17, 2015)     Mr. Handy had microwave thermotherapy on the above date. He pulled his catheter this morning for voiding trial, but called and said he was having bleeding. We advised him to come to the office because he was not sure if he was emptying his bladder or not. He has been voiding in relatively small amounts with some clots. (March 20, 2015)     Patient  pulled catheter again yesterday as instructed. He voided fairly well for several hours then started having more trouble late in the day yesterday with increased frequency and came in today because of marked frequency and urgency. Urine has been reasonably clear. Patient slightly uncomfortable on arrival, and it is felt that he needs to have another catheter for another few days.  (March 25, 2015)     Mr. Handy is now slightly over one month post microwave thermotherapy. He pulled his catheter again on March 31 and has been voiding since. He went to see Dr. Abreu last Friday and was cultured and placed on Cipro which he is still on. He saw a little blood pass on Monday but none since. He has nocturia 2 or 3 times nightly. He feels he is voiding in smaller amounts in the daytime than he does at night. He can tell he is making significant progress. He has had no fever but he has had burning on urination.  (April 22, 2015)     Mr. Handy is now a little over 3 months post microwave thermotherapy. This was done March 17 . He has improved with his voiding. He is on Flomax twice a day and Avodart once daily. Voiding is reasonably good and he has a good stream now. He does have urgency at times. He has nocturia only one time nightly. No new problems. Plans a trip to his grandson's wedding next month in [place].  (June 24, 2015)     Mr. Handy is in for six-month follow-up visit. Voiding okay now and his urgency problems are finally better. His microwave thermotherapy was March 17, 2015. He is still on Flomax twice daily. He is also on finasteride daily. Doing better and seems to be satisfied that things are doing well currently from  standpoint.  (January 6, 2016)     Mr. Handy is in for six-month checkup. He is taking one tamsulosin nightly now, and he is off finasteride and the other Flomax. He is doing okay with voiding and has nocturia only one time nightly. He also had left carpal tunnel surgery but had waited a few  years and has not had good results from that. He has doing reasonably well from  standpoint. He had a PSA in January of 1.08 which is perfectly normal.  (July 18, 2016)     Mr. Handy is in for one year follow-up visit and is doing satisfactory with voiding. Still having nocturia  no more than one time nightly. No new problems, and stable from  standpoint. His PSA done in [place] on May 31st was reported as being immeasurable at less than 0.13. That is suspicious that this is erroneously low as it is much lower than previous ones, but at least it is unlikely that there would be significant elevation. I do not see a reason to repeat PSA this year and just have another one next year. (July 25, 2017)     Mr. Handy is in for his first visit in over a year. He had an umbilical hernia repair about 8 weeks ago. Otherwise uneventful year. He is stable with voiding with nocturia typically one time nightly. He does have some postvoiding dribbling that's aggravating but no other major issues. He had a cardiac evaluation by Dr. Martins after he had some chest pain but workup was negative.  (October 1, 2018)     Mr. Handy is an for first visit in one year. Recent right shoulder surgery. Voiding fairly well except on prolonged trips when he rides a long way slows his urine down. Typically nocturia x2. PSA done and pending. (November 14, 2019)     Mr. Handy is in for screening. Having increasing difficulty with voiding. He has nocturia 3-5 times nightly with occasional urge incontinence. Steadily worsened since his last visit. He is taking his medications regularly. Also concerned he might have a stone because he is having left flank pain that has been going on for 2 weeks, but worse for the last few days. Previous lithotripsy done in late 1990s. No stones recently and that he is aware of. No visible blood in urine. (January 11,  2021)  ------------------------------------------------------------------------------------------------------------------------------------------------------------------------------------------------------------     PSA was 2.730 on January 11, 2021  PSA was 3.830 on November 14, 2019  PSA was 2.580 on October 1, 2018  PSA was 1.020 on January 13, 2016  2.36 on 8/30/2013  ----------------------------------------------------------------------------------------  -------------------------------------------------------------------------------------------------------------------------------------------------------------------------------------------------------------------------------------------------------------------------------------------  The above notes are notes from the old electronic medical record     PSA was 1.380 on March 29, 2022     Mr. Handy is in for his follow-up visit.  He was supposed to get PSA but did not get it done and I think it will be fine to skip this time.  He may be some better than he was and he has nocturia only 1 time nightly now typically.  He has some problem if he has to wait to void.  Has had no recent stone problems.  He is on maximum pharmacologic management of his bladder outlet obstruction taking the tamsulosin b.i.d. and finasteride once daily.  He has had no new health issues since last visit. (September 29, 2021)      Mr. Handy is in for 6 month follow-up of bladder outlet obstructive symptoms related to BPH.  He has been doing very well on the combination that he is on which is tamsulosin and finasteride.  Sometimes nocturia no more than 1 time nightly.  He feels he is doing at least as well as he has in recent past. [September 29, 2022]    Review of Systems      Objective:      Physical Exam  Constitutional:       Appearance: Normal appearance. He is normal weight.   Genitourinary:     Prostate: Normal.      Rectum: Normal.      Comments: Prostate gland 30 g smooth  symmetrical and benign feeling  Neurological:      Mental Status: He is alert.   Psychiatric:         Mood and Affect: Mood normal.         Behavior: Behavior normal.     Urinalysis unremarkable with occasional pus cells.  Dipstick negative with pH 5.0 and specific gravity 1.020  Assessment:       Problem List Items Addressed This Visit    None  Visit Diagnoses       Benign prostatic hyperplasia with urinary obstruction    -  Primary    Screening for prostate cancer        Relevant Orders    PSA, Screening    Bladder outlet obstruction        Chronic prostatitis                Plan:       Patient doing okay with bladder outlet obstruction on current medication.  Renewed tamsulosin.  Finasteride renewed 6 months ago.  Six month appointment with PSA or sooner if needed.

## 2022-09-30 RX ORDER — TAMSULOSIN HYDROCHLORIDE 0.4 MG/1
0.4 CAPSULE ORAL EVERY 12 HOURS
Qty: 180 CAPSULE | Refills: 3 | Status: SHIPPED | OUTPATIENT
Start: 2022-09-30 | End: 2023-03-30 | Stop reason: SDUPTHER

## 2022-09-30 NOTE — PATIENT INSTRUCTIONS
Patient doing okay with bladder outlet obstruction on current medication.  Renewed tamsulosin.  Finasteride renewed 6 months ago.  Six month appointment with PSA or sooner if needed.

## 2023-02-28 DIAGNOSIS — Z47.89 ENCOUNTER FOR ORTHOPEDIC FOLLOW-UP CARE: Primary | ICD-10-CM

## 2023-02-28 DIAGNOSIS — M25.512 ACUTE PAIN OF LEFT SHOULDER: ICD-10-CM

## 2023-02-28 DIAGNOSIS — M75.42 IMPINGEMENT SYNDROME OF LEFT SHOULDER: ICD-10-CM

## 2023-03-01 ENCOUNTER — OFFICE VISIT (OUTPATIENT)
Dept: ORTHOPEDICS | Facility: CLINIC | Age: 78
End: 2023-03-01
Payer: MEDICARE

## 2023-03-01 ENCOUNTER — HOSPITAL ENCOUNTER (OUTPATIENT)
Dept: RADIOLOGY | Facility: HOSPITAL | Age: 78
Discharge: HOME | End: 2023-03-01
Attending: ORTHOPAEDIC SURGERY
Payer: MEDICARE

## 2023-03-01 DIAGNOSIS — M75.42 IMPINGEMENT SYNDROME OF LEFT SHOULDER: ICD-10-CM

## 2023-03-01 DIAGNOSIS — M25.512 ACUTE PAIN OF LEFT SHOULDER: Primary | ICD-10-CM

## 2023-03-01 PROCEDURE — 73030 X-RAY EXAM OF SHOULDER: CPT | Mod: TC,LT

## 2023-03-01 PROCEDURE — 20610 PR DRAIN/INJECT LARGE JOINT/BURSA: ICD-10-PCS | Mod: S$PBB,LT,, | Performed by: ORTHOPAEDIC SURGERY

## 2023-03-01 PROCEDURE — 73030 X-RAY EXAM OF SHOULDER: CPT | Mod: 26,LT,, | Performed by: ORTHOPAEDIC SURGERY

## 2023-03-01 PROCEDURE — 73030 XR SHOULDER COMPLETE 2 OR MORE VIEWS LEFT: ICD-10-PCS | Mod: 26,LT,, | Performed by: ORTHOPAEDIC SURGERY

## 2023-03-01 PROCEDURE — 99213 PR OFFICE/OUTPT VISIT, EST, LEVL III, 20-29 MIN: ICD-10-PCS | Mod: S$PBB,25,, | Performed by: ORTHOPAEDIC SURGERY

## 2023-03-01 PROCEDURE — 20610 DRAIN/INJ JOINT/BURSA W/O US: CPT | Mod: S$PBB,LT,, | Performed by: ORTHOPAEDIC SURGERY

## 2023-03-01 PROCEDURE — 20610 DRAIN/INJ JOINT/BURSA W/O US: CPT | Mod: PBBFAC,LT | Performed by: ORTHOPAEDIC SURGERY

## 2023-03-01 PROCEDURE — 99213 OFFICE O/P EST LOW 20 MIN: CPT | Mod: S$PBB,25,, | Performed by: ORTHOPAEDIC SURGERY

## 2023-03-01 PROCEDURE — 99213 OFFICE O/P EST LOW 20 MIN: CPT | Mod: PBBFAC,25 | Performed by: ORTHOPAEDIC SURGERY

## 2023-03-01 RX ORDER — BUPIVACAINE HYDROCHLORIDE 2.5 MG/ML
1 INJECTION, SOLUTION INFILTRATION; PERINEURAL
Status: COMPLETED | OUTPATIENT
Start: 2023-03-01 | End: 2023-03-01

## 2023-03-01 RX ORDER — TRIAMCINOLONE ACETONIDE 40 MG/ML
40 INJECTION, SUSPENSION INTRA-ARTICULAR; INTRAMUSCULAR
Status: COMPLETED | OUTPATIENT
Start: 2023-03-01 | End: 2023-03-01

## 2023-03-01 RX ADMIN — TRIAMCINOLONE ACETONIDE 40 MG: 40 INJECTION, SUSPENSION INTRA-ARTICULAR; INTRAMUSCULAR at 09:03

## 2023-03-01 RX ADMIN — BUPIVACAINE HYDROCHLORIDE 2.5 MG: 2.5 INJECTION, SOLUTION INFILTRATION; PERINEURAL at 09:03

## 2023-03-01 NOTE — PROGRESS NOTES
Patient is here for follow-up of his left shoulder impingement and degenerative changes.  This time we discussed treatment options.  He wish another injection.  He has pain on impingement crossed adduction testing.  I injected his left shoulder 1 cc Marcaine 1 cc Kenalog getting relief the symptoms.  The his arm as tolerates.  I will follow-up 3-4 months.Recommendation is for a steroid in injection in the shoulder. Risks and benefits of the procedure were explained. The patient verbalized understanding and did wish to proceed. After verbal consent was obtained we proceeded with injection. The shoulder was prepped with alcohol  betadine and the skin was anesthetized with cold spray. The shoulder was injected with a mixture of 1cc marcaine, and 1cc kenalog. A bandage was applied. The patient tolerated the injection well

## 2023-03-02 ENCOUNTER — OFFICE VISIT (OUTPATIENT)
Dept: OTOLARYNGOLOGY | Facility: CLINIC | Age: 78
End: 2023-03-02
Payer: MEDICARE

## 2023-03-02 VITALS — WEIGHT: 212 LBS | HEIGHT: 72 IN | BODY MASS INDEX: 28.71 KG/M2

## 2023-03-02 DIAGNOSIS — H90.3 SENSORINEURAL HEARING LOSS (SNHL) OF BOTH EARS: Primary | ICD-10-CM

## 2023-03-02 PROCEDURE — 99204 OFFICE O/P NEW MOD 45 MIN: CPT | Mod: S$PBB,,, | Performed by: OTOLARYNGOLOGY

## 2023-03-02 PROCEDURE — 99204 PR OFFICE/OUTPT VISIT, NEW, LEVL IV, 45-59 MIN: ICD-10-PCS | Mod: S$PBB,,, | Performed by: OTOLARYNGOLOGY

## 2023-03-02 PROCEDURE — 99213 OFFICE O/P EST LOW 20 MIN: CPT | Mod: PBBFAC | Performed by: OTOLARYNGOLOGY

## 2023-03-02 NOTE — PROGRESS NOTES
Subjective:       Patient ID: Giorgio Handy is a 77 y.o. male.    Chief Complaint: Hearing Loss (Bilateral hearing loss. Pt has worn hearing aides in the past, had hearing test done at [facility]. )    HPI  Review of Systems   Constitutional:  Negative for chills, fatigue and fever.   HENT:  Positive for hearing loss. Negative for ear discharge and ear pain.        Objective:      Physical Exam  Constitutional:       Appearance: Normal appearance.   HENT:      Head: Normocephalic.      Right Ear: Tympanic membrane, ear canal and external ear normal.      Left Ear: Tympanic membrane, ear canal and external ear normal.      Nose: Nose normal.      Mouth/Throat:      Lips: Pink.      Mouth: Mucous membranes are moist.      Pharynx: Oropharynx is clear.   Eyes:      Pupils: Pupils are equal, round, and reactive to light.   Pulmonary:      Effort: Pulmonary effort is normal.   Skin:     General: Skin is warm and dry.   Neurological:      Mental Status: He is alert and oriented to person, place, and time.   Psychiatric:         Mood and Affect: Mood normal.         Behavior: Behavior is cooperative.       Assessment:       Problem List Items Addressed This Visit    None  Visit Diagnoses       Sensorineural hearing loss (SNHL) of both ears    -  Primary            Plan:   Reviewed audiogram.  Okay for hearing aids.

## 2023-03-30 ENCOUNTER — OFFICE VISIT (OUTPATIENT)
Dept: UROLOGY | Facility: CLINIC | Age: 78
End: 2023-03-30
Payer: MEDICARE

## 2023-03-30 VITALS
HEIGHT: 72 IN | SYSTOLIC BLOOD PRESSURE: 111 MMHG | HEART RATE: 79 BPM | BODY MASS INDEX: 29.12 KG/M2 | DIASTOLIC BLOOD PRESSURE: 60 MMHG | WEIGHT: 215 LBS

## 2023-03-30 DIAGNOSIS — N41.1 CHRONIC PROSTATITIS: ICD-10-CM

## 2023-03-30 DIAGNOSIS — N32.0 BLADDER OUTLET OBSTRUCTION: ICD-10-CM

## 2023-03-30 DIAGNOSIS — N40.1 BENIGN PROSTATIC HYPERPLASIA WITH URINARY OBSTRUCTION: ICD-10-CM

## 2023-03-30 DIAGNOSIS — N13.8 BENIGN PROSTATIC HYPERPLASIA WITH URINARY OBSTRUCTION: ICD-10-CM

## 2023-03-30 DIAGNOSIS — Z12.5 SCREENING PSA (PROSTATE SPECIFIC ANTIGEN): Primary | ICD-10-CM

## 2023-03-30 PROCEDURE — 99214 OFFICE O/P EST MOD 30 MIN: CPT | Mod: PBBFAC | Performed by: UROLOGY

## 2023-03-30 PROCEDURE — 99213 PR OFFICE/OUTPT VISIT, EST, LEVL III, 20-29 MIN: ICD-10-PCS | Mod: S$PBB,,, | Performed by: UROLOGY

## 2023-03-30 PROCEDURE — 99213 OFFICE O/P EST LOW 20 MIN: CPT | Mod: S$PBB,,, | Performed by: UROLOGY

## 2023-03-30 RX ORDER — FINASTERIDE 5 MG/1
5 TABLET, FILM COATED ORAL DAILY
Qty: 90 TABLET | Refills: 3 | Status: SHIPPED | OUTPATIENT
Start: 2023-03-30

## 2023-03-30 RX ORDER — TAMSULOSIN HYDROCHLORIDE 0.4 MG/1
0.4 CAPSULE ORAL EVERY 12 HOURS
Qty: 180 CAPSULE | Refills: 3 | Status: SHIPPED | OUTPATIENT
Start: 2023-03-30

## 2023-03-30 NOTE — PATIENT INSTRUCTIONS
Patient doing fairly well.  Renewed tamsulosin and finasteride.  PSA returned after he left the office and is normal at 1.580.  He will be notified of results by telephone.  1 year appointment with PSA as he wishes to continue monitoring.

## 2023-03-30 NOTE — PROGRESS NOTES
Subjective     Patient ID: Giorgio Handy is a 78 y.o. male.    Chief Complaint: Benign Prostatic Hypertrophy (6 month PSA z12.5 and OV)    History of Present Illness  I Received a phone call from Dr. Soto Lyman from the [facility] emergency room yesterday about Mr. Handy. He was seen as a work-in patient because of urinary retention that occurred yesterday. He had over 1000 mL of urine in his bladder when  he was catheterized in the emergency room and is wearing an indwelling Beard now. I have not seen Mr. Handy in over 3 years. His last visit here was January 3, 2011. Patient was sent to me by Dr. Abreu in 2009 for bladder outlet obstructive symptoms and gross hematuria. He had a history of nephrolithiasis. He had back problems as a young adult. 3 back surgeries. Patient had a workup that showed a moderately enlarged prostate with a little bit of middle lobe enlargement on September 16, 2009. I thought he did have some neurovesical dysfunction also as well as an enlarged prostate. Patient was having more difficulty voiding when I last saw him in January 2011 and I switched him from Flomax to Svitlana. It is unclear if this was helpful or not but I suspect he stopped it because of the expense. He does not remember. He is back on Flomax now and has been on this for several years. He had not taken any over-the-counter cold medicines or any other medicine that might have triggered him to have urinary retention. He was going about his normal routine when it occurred suddenly. He had not had worsening of bladder outlet obstruction. He has had nothing suggestive of infection etc. He has had increased bleeding through the urinary tract again. He has had some problems for the last few  weeks and I think that enlarged prostate and chronic prostatitis are probably responsible for that. Urine has been blood tinged since the catheter was inserted yesterday.   Patient had arthroscopic knee surgery last year and did not require wearing  a catheter at that time. His last back operation was in 1983. In recent years he considers nocturia 3 or 4 times nightly to be normal. His IPSS score totals 34 however. Patient has been getting his PSAs at Dr. Abreu's office and apparently all of these have been okay although I don't have any of these in our records. We don't know what his PSA has run historically either.   (August 1, 2014)     Mr. Handy is significantly better than he was on the above visit. His nocturia is down to one time nightly where as it was 3-4 times nightly. He had no trouble voiding after the catheter was removed and is still on  Avodart plus Flomax twice a day. Tolerating well but would like to have something cheaper than the Avodart. We will switch to finasteride now as he has been on Avodart for 2 months.  (October 6, 2014)     Mr. Handy was placed on finasteride last time but had switch back to Avodart in December because he thought he did not do as well. We sent this in because he thought he was not doing any better. He continues to have problems with voiding. He has urgency of urination with nocturia 2 or 3 times nightly. He thinks he may be having some less daytime frequency since switching back to Avodart but still has a major problem with bladder emptying. Probably needs to have full workup to see what else needs to be done as he is on maximum pharmacologic regimen.  (February 10, 2015)     Mr. Handy returned today for microwave thermotherapy as previously planned. His wife has reviewed information and he has read some of it. Questions answered. He wanted to proceed. Voiding is stable. (March 17, 2015)     Mr. Handy had microwave thermotherapy on the above date. He pulled his catheter this morning for voiding trial, but called and said he was having bleeding. We advised him to come to the office because he was not sure if he was emptying his bladder or not. He has been voiding in relatively small amounts with some clots. (March  20, 2015)     Patient pulled catheter again yesterday as instructed. He voided fairly well for several hours then started having more trouble late in the day yesterday with increased frequency and came in today because of marked frequency and urgency. Urine has been reasonably clear. Patient slightly uncomfortable on arrival, and it is felt that he needs to have another catheter for another few days.  (March 25, 2015)     Mr. Handy is now slightly over one month post microwave thermotherapy. He pulled his catheter again on March 31 and has been voiding since. He went to see Dr. Abreu last Friday and was cultured and placed on Cipro which he is still on. He saw a little blood pass on Monday but none since. He has nocturia 2 or 3 times nightly. He feels he is voiding in smaller amounts in the daytime than he does at night. He can tell he is making significant progress. He has had no fever but he has had burning on urination.  (April 22, 2015)     Mr. Handy is now a little over 3 months post microwave thermotherapy. This was done March 17 . He has improved with his voiding. He is on Flomax twice a day and Avodart once daily. Voiding is reasonably good and he has a good stream now. He does have urgency at times. He has nocturia only one time nightly. No new problems. Plans a trip to his grandson's wedding next month in [place].  (June 24, 2015)     Mr. Handy is in for six-month follow-up visit. Voiding okay now and his urgency problems are finally better. His microwave thermotherapy was March 17, 2015. He is still on Flomax twice daily. He is also on finasteride daily. Doing better and seems to be satisfied that things are doing well currently from  standpoint.  (January 6, 2016)     Mr. Handy is in for six-month checkup. He is taking one tamsulosin nightly now, and he is off finasteride and the other Flomax. He is doing okay with voiding and has nocturia only one time nightly. He also had left carpal tunnel surgery  but had waited a few years and has not had good results from that. He has doing reasonably well from  standpoint. He had a PSA in January of 1.08 which is perfectly normal.  (July 18, 2016)     Mr. Handy is in for one year follow-up visit and is doing satisfactory with voiding. Still having nocturia  no more than one time nightly. No new problems, and stable from  standpoint. His PSA done in [place] on May 31st was reported as being immeasurable at less than 0.13. That is suspicious that this is erroneously low as it is much lower than previous ones, but at least it is unlikely that there would be significant elevation. I do not see a reason to repeat PSA this year and just have another one next year. (July 25, 2017)     Mr. Handy is in for his first visit in over a year. He had an umbilical hernia repair about 8 weeks ago. Otherwise uneventful year. He is stable with voiding with nocturia typically one time nightly. He does have some postvoiding dribbling that's aggravating but no other major issues. He had a cardiac evaluation by Dr. Martins after he had some chest pain but workup was negative.  (October 1, 2018)     Mr. Handy is an for first visit in one year. Recent right shoulder surgery. Voiding fairly well except on prolonged trips when he rides a long way slows his urine down. Typically nocturia x2. PSA done and pending. (November 14, 2019)     Mr. Handy is in for screening. Having increasing difficulty with voiding. He has nocturia 3-5 times nightly with occasional urge incontinence. Steadily worsened since his last visit. He is taking his medications regularly. Also concerned he might have a stone because he is having left flank pain that has been going on for 2 weeks, but worse for the last few days. Previous lithotripsy done in late 1990s. No stones recently and that he is aware of. No visible blood in urine. (January 11,  2021)  ------------------------------------------------------------------------------------------------------------------------------------------------------------------------------------------------------------     PSA was 2.730 on January 11, 2021  PSA was 3.830 on November 14, 2019  PSA was 2.580 on October 1, 2018  PSA was 1.020 on January 13, 2016  2.36 on 8/30/2013  ----------------------------------------------------------------------------------------  -------------------------------------------------------------------------------------------------------------------------------------------------------------------------------------------------------------------------------------------------------------------------------------------  The above notes are notes from the old electronic medical record     PSA was 1.380 on March 29, 2022  PSA was 1.580 on March 30, 2023     Mr. Handy is in for his follow-up visit.  He was supposed to get PSA but did not get it done and I think it will be fine to skip this time.  He may be some better than he was and he has nocturia only 1 time nightly now typically.  He has some problem if he has to wait to void.  Has had no recent stone problems.  He is on maximum pharmacologic management of his bladder outlet obstruction taking the tamsulosin b.i.d. and finasteride once daily.  He has had no new health issues since last visit. (September 29, 2021)      Mr. Handy is in for 6 month follow-up of bladder outlet obstructive symptoms related to BPH.  He has been doing very well on the combination that he is on which is tamsulosin and finasteride.  Sometimes nocturia no more than 1 time nightly.  He feels he is doing at least as well as he has in recent past. [September 29, 2022    Review of Systems       Objective     Physical Exam  Constitutional:       Appearance: Normal appearance. He is normal weight.   Genitourinary:     Prostate: Normal.      Rectum: Normal.      Comments:  Prostate 25 g smooth firm and symmetrical  Neurological:      Mental Status: He is alert.   Psychiatric:         Mood and Affect: Mood normal.         Behavior: Behavior normal.      Urinalysis revealed occasional pus cells with occasional epithelial cells.  The dipstick had a trace of protein with pH of 6.0 and specific gravity 1.030  Assessment and Plan     Problem List Items Addressed This Visit    None  Visit Diagnoses       Screening PSA (prostate specific antigen)    -  Primary    Relevant Orders    PSA, Screening    Benign prostatic hyperplasia with urinary obstruction        Bladder outlet obstruction        Chronic prostatitis              Patient doing fairly well.  Renewed tamsulosin and finasteride.  PSA returned after he left the office and is normal at 1.580.  He will be notified of results by telephone.  1 year appointment with PSA as he wishes to continue monitoring.

## 2023-07-03 NOTE — PROGRESS NOTES
Radiology Interpretation        Patient Name: Giorgio Handy  Date: 7/3/2023  YOB: 1945  MRN# [id number]        ORDERING DIAGNOSIS:    Encounter Diagnosis   Name Primary?    Acute pain of left shoulder Yes           Two views AP lateral left shoulder skeletally mature individual there is normal mineralization there are degenerative changes AC joint as well as glenohumeral joint no fractures no subluxations impression degenerative changes left shoulder see above          Derek Blanc MD

## 2024-04-08 ENCOUNTER — OFFICE VISIT (OUTPATIENT)
Dept: UROLOGY | Facility: CLINIC | Age: 79
End: 2024-04-08
Payer: MEDICARE

## 2024-04-08 VITALS
SYSTOLIC BLOOD PRESSURE: 156 MMHG | DIASTOLIC BLOOD PRESSURE: 73 MMHG | WEIGHT: 216 LBS | BODY MASS INDEX: 29.26 KG/M2 | HEIGHT: 72 IN | HEART RATE: 77 BPM

## 2024-04-08 DIAGNOSIS — N13.8 BENIGN PROSTATIC HYPERPLASIA WITH URINARY OBSTRUCTION: ICD-10-CM

## 2024-04-08 DIAGNOSIS — Z12.5 SCREENING PSA (PROSTATE SPECIFIC ANTIGEN): Primary | ICD-10-CM

## 2024-04-08 DIAGNOSIS — N40.1 BENIGN PROSTATIC HYPERPLASIA WITH URINARY OBSTRUCTION: ICD-10-CM

## 2024-04-08 DIAGNOSIS — N41.1 CHRONIC PROSTATITIS: ICD-10-CM

## 2024-04-08 DIAGNOSIS — N32.0 BLADDER OUTLET OBSTRUCTION: ICD-10-CM

## 2024-04-08 PROCEDURE — 99214 OFFICE O/P EST MOD 30 MIN: CPT | Mod: PBBFAC | Performed by: UROLOGY

## 2024-04-08 PROCEDURE — 99213 OFFICE O/P EST LOW 20 MIN: CPT | Mod: S$PBB,,, | Performed by: UROLOGY

## 2024-04-08 RX ORDER — FINASTERIDE 5 MG/1
5 TABLET, FILM COATED ORAL DAILY
Qty: 90 TABLET | Refills: 3 | Status: SHIPPED | OUTPATIENT
Start: 2024-04-08

## 2024-04-08 RX ORDER — PANTOPRAZOLE SODIUM 40 MG/1
40 TABLET, DELAYED RELEASE ORAL DAILY
COMMUNITY
Start: 2024-01-25

## 2024-04-08 RX ORDER — TAMSULOSIN HYDROCHLORIDE 0.4 MG/1
0.4 CAPSULE ORAL EVERY 12 HOURS
Qty: 180 CAPSULE | Refills: 3 | Status: SHIPPED | OUTPATIENT
Start: 2024-04-08

## 2024-04-08 NOTE — PROGRESS NOTES
Subjective     Patient ID: Giorgio Handy is a 79 y.o. male.    Chief Complaint: Follow-up (One year visit, PSA screening)    History of Present Illness    I Received a phone call from Dr. Soto Lyman from the [facility] emergency room yesterday about Mr. Handy. He was seen as a work-in patient because of urinary retention that occurred yesterday. He had over 1000 mL of urine in his bladder when  he was catheterized in the emergency room and is wearing an indwelling Beard now. I have not seen Mr. Handy in over 3 years. His last visit here was January 3, 2011. Patient was sent to me by Dr. Abreu in 2009 for bladder outlet obstructive symptoms and gross hematuria. He had a history of nephrolithiasis. He had back problems as a young adult. 3 back surgeries. Patient had a workup that showed a moderately enlarged prostate with a little bit of middle lobe enlargement on September 16, 2009. I thought he did have some neurovesical dysfunction also as well as an enlarged prostate. Patient was having more difficulty voiding when I last saw him in January 2011 and I switched him from Flomax to Svitlana. It is unclear if this was helpful or not but I suspect he stopped it because of the expense. He does not remember. He is back on Flomax now and has been on this for several years. He had not taken any over-the-counter cold medicines or any other medicine that might have triggered him to have urinary retention. He was going about his normal routine when it occurred suddenly. He had not had worsening of bladder outlet obstruction. He has had nothing suggestive of infection etc. He has had increased bleeding through the urinary tract again. He has had some problems for the last few  weeks and I think that enlarged prostate and chronic prostatitis are probably responsible for that. Urine has been blood tinged since the catheter was inserted yesterday.   Patient had arthroscopic knee surgery last year and did not require wearing a catheter  at that time. His last back operation was in 1983. In recent years he considers nocturia 3 or 4 times nightly to be normal. His IPSS score totals 34 however. Patient has been getting his PSAs at Dr. Abreu's office and apparently all of these have been okay although I don't have any of these in our records. We don't know what his PSA has run historically either.   (August 1, 2014)     Mr. Handy is significantly better than he was on the above visit. His nocturia is down to one time nightly where as it was 3-4 times nightly. He had no trouble voiding after the catheter was removed and is still on  Avodart plus Flomax twice a day. Tolerating well but would like to have something cheaper than the Avodart. We will switch to finasteride now as he has been on Avodart for 2 months.  (October 6, 2014)     Mr. Handy was placed on finasteride last time but had switch back to Avodart in December because he thought he did not do as well. We sent this in because he thought he was not doing any better. He continues to have problems with voiding. He has urgency of urination with nocturia 2 or 3 times nightly. He thinks he may be having some less daytime frequency since switching back to Avodart but still has a major problem with bladder emptying. Probably needs to have full workup to see what else needs to be done as he is on maximum pharmacologic regimen.  (February 10, 2015)     Mr. Handy returned today for microwave thermotherapy as previously planned. His wife has reviewed information and he has read some of it. Questions answered. He wanted to proceed. Voiding is stable. (March 17, 2015)     Mr. Handy had microwave thermotherapy on the above date. He pulled his catheter this morning for voiding trial, but called and said he was having bleeding. We advised him to come to the office because he was not sure if he was emptying his bladder or not. He has been voiding in relatively small amounts with some clots. (March 20, 2015)      Patient pulled catheter again yesterday as instructed. He voided fairly well for several hours then started having more trouble late in the day yesterday with increased frequency and came in today because of marked frequency and urgency. Urine has been reasonably clear. Patient slightly uncomfortable on arrival, and it is felt that he needs to have another catheter for another few days.  (March 25, 2015)     Mr. Handy is now slightly over one month post microwave thermotherapy. He pulled his catheter again on March 31 and has been voiding since. He went to see Dr. Abreu last Friday and was cultured and placed on Cipro which he is still on. He saw a little blood pass on Monday but none since. He has nocturia 2 or 3 times nightly. He feels he is voiding in smaller amounts in the daytime than he does at night. He can tell he is making significant progress. He has had no fever but he has had burning on urination.  (April 22, 2015)     Mr. Handy is now a little over 3 months post microwave thermotherapy. This was done March 17 . He has improved with his voiding. He is on Flomax twice a day and Avodart once daily. Voiding is reasonably good and he has a good stream now. He does have urgency at times. He has nocturia only one time nightly. No new problems. Plans a trip to his grandson's wedding next month in [place].  (June 24, 2015)     Mr. Handy is in for six-month follow-up visit. Voiding okay now and his urgency problems are finally better. His microwave thermotherapy was March 17, 2015. He is still on Flomax twice daily. He is also on finasteride daily. Doing better and seems to be satisfied that things are doing well currently from  standpoint.  (January 6, 2016)     Mr. Handy is in for six-month checkup. He is taking one tamsulosin nightly now, and he is off finasteride and the other Flomax. He is doing okay with voiding and has nocturia only one time nightly. He also had left carpal tunnel surgery but had  waited a few years and has not had good results from that. He has doing reasonably well from  standpoint. He had a PSA in January of 1.08 which is perfectly normal.  (July 18, 2016)     Mr. Handy is in for one year follow-up visit and is doing satisfactory with voiding. Still having nocturia  no more than one time nightly. No new problems, and stable from  standpoint. His PSA done in [place] on May 31st was reported as being immeasurable at less than 0.13. That is suspicious that this is erroneously low as it is much lower than previous ones, but at least it is unlikely that there would be significant elevation. I do not see a reason to repeat PSA this year and just have another one next year. (July 25, 2017)     Mr. Handy is in for his first visit in over a year. He had an umbilical hernia repair about 8 weeks ago. Otherwise uneventful year. He is stable with voiding with nocturia typically one time nightly. He does have some postvoiding dribbling that's aggravating but no other major issues. He had a cardiac evaluation by Dr. Martins after he had some chest pain but workup was negative.  (October 1, 2018)     Mr. Handy is an for first visit in one year. Recent right shoulder surgery. Voiding fairly well except on prolonged trips when he rides a long way slows his urine down. Typically nocturia x2. PSA done and pending. (November 14, 2019)     Mr. Handy is in for screening. Having increasing difficulty with voiding. He has nocturia 3-5 times nightly with occasional urge incontinence. Steadily worsened since his last visit. He is taking his medications regularly. Also concerned he might have a stone because he is having left flank pain that has been going on for 2 weeks, but worse for the last few days. Previous lithotripsy done in late 1990s. No stones recently and that he is aware of. No visible blood in urine. (January 11,  2021)  ------------------------------------------------------------------------------------------------------------------------------------------------------------------------------------------------------------     PSA was 2.730 on January 11, 2021  PSA was 3.830 on November 14, 2019  PSA was 2.580 on October 1, 2018  PSA was 1.020 on January 13, 2016  2.36 on 8/30/2013  ----------------------------------------------------------------------------------------  -------------------------------------------------------------------------------------------------------------------------------------------------------------------------------------------------------------------------------------------------------------------------------------------  The above notes are notes from the old electronic medical record     PSA was 1.380 on March 29, 2022  PSA was 1.580 on March 30, 2023  PSA was 1.700 on April 8, 2024     Mr. Handy is in for his follow-up visit.  He was supposed to get PSA but did not get it done and I think it will be fine to skip this time.  He may be some better than he was and he has nocturia only 1 time nightly now typically.  He has some problem if he has to wait to void.  Has had no recent stone problems.  He is on maximum pharmacologic management of his bladder outlet obstruction taking the tamsulosin b.i.d. and finasteride once daily.  He has had no new health issues since last visit. (September 29, 2021)      Mr. Handy is in for 6 month follow-up of bladder outlet obstructive symptoms related to BPH.  He has been doing very well on the combination that he is on which is tamsulosin and finasteride.  Sometimes nocturia no more than 1 time nightly.  He feels he is doing at least as well as he has in recent past. [September 29, 2022]     Patient doing fairly well.  Renewed tamsulosin and finasteride.  PSA returned after he left the office and is normal at 1.580.  He will be notified of results by  telephone.  1 year appointment with PSA as he wishes to continue monitoring.  [March 30, 2023]    Mr. Handy is in for his 1 year follow-up and PSA.  He is sleeping all night basically now that he is using his CPAP and has nocturia normally 0 times nightly.  Feels like he is doing okay in general.  Needs medication renewal.  On combination finasteride and tamsulosin.  Stable in general.  [April 8, 2024]          Review of Systems       Objective     Physical Exam  Constitutional:       Appearance: Normal appearance. He is normal weight.   Genitourinary:     Prostate: Normal.      Rectum: Normal.      Comments: Prostate 30 g symmetrical and benign feeling  Neurological:      Mental Status: He is alert.   Psychiatric:         Mood and Affect: Mood normal.         Behavior: Behavior normal.     Urinalysis showed occasional pus cells and rare red cells.  Dipstick had a moderate amount of blood with pH 6.0 and specific gravity 1.025     Assessment and Plan     1. Screening PSA (prostate specific antigen)  -     PSA, Screening; Future; Expected date: 04/14/2025    2. Benign prostatic hyperplasia with urinary obstruction    3. Bladder outlet obstruction    4. Chronic prostatitis    Other orders  -     finasteride (PROSCAR) 5 mg tablet; Take 1 tablet (5 mg total) by mouth once daily.  Dispense: 90 tablet; Refill: 3  -     tamsulosin (FLOMAX) 0.4 mg Cap; Take 1 capsule (0.4 mg total) by mouth every 12 (twelve) hours.  Dispense: 180 capsule; Refill: 3        Renewed tamsulosin and finasteride.  PSA returned after patient left the office and is satisfactory at 1.700.  He will be notified of results by telephone.  Patient understands that I plan to retire June 30th.  We will set him up to see Mr. Howard Borja in 1 year with next PSA        No follow-ups on file.

## 2024-04-09 NOTE — PATIENT INSTRUCTIONS
Renewed tamsulosin and finasteride.  PSA returned after patient left the office and is satisfactory at 1.700.  He will be notified of results by telephone.  Patient understands that I plan to retire June 30th.  We will set him up to see Mr. Howard Borja in 1 year with next PSA

## 2025-04-09 NOTE — PROGRESS NOTES
Subjective     Patient ID: Giorgio Handy is a 80 y.o. male.    Chief Complaint:  Yearly follow-up for PSA screening and BPH with LUTS    This 80-year-old male presents to the clinic for yearly follow-up of screening PSA and BPH with LUTS.  He has a previous patient of Dr. Walter Calles.  Patient states he is doing good today.  His PSA was not resulted by the end of today's visit.  His most recent PSA was 1.700 on April 8, 2024 and within normal limits.  He is pleased with the way he is voiding.  He is on finasteride 5 mg 1 daily and Flomax 0.4 mg 1 capsule twice a day.  He is tolerating these medications without side effects.  His IPSS score is 15 that reflects incomplete bladder emptying, frequency, intermittency, urgency, weak stream, straining, and nocturia 1 time a night.  His PVR was 078 MLS.  He denies dysuria or hematuria.  He denies fever, chills, nausea, or vomiting.  He denies any  pains.  He does have some chronic back pain related to 3 back surgeries in the past.  He does have a history of microwave therapy to the prostate.  He requests a refill on his medications.  Through shared decision-making with the patient, he desires to continue to monitor the PSA yearly.  He desires to continue the finasteride and Flomax as prescribed and I refilled these medications as requested.  We discussed decreasing bladder irritants such as caffeine and carbonated beverages.  I will plan to see the patient back in the clinic in 1 year with a PSA pending today's results.  I discussed the plan in detail with the patient and he is in agreement with the plan.  All his questions were answered at today's visit.  I spent 30 minutes counseling this patient, reviewing the chart, imaging and labs.    PSA history:  PSA was 1.380 on March 29, 2022  PSA was 1.580 on March 30, 2023  PSA was 1.700 on April 8, 2024  ------------------------------------------------  [April 14, 2025].          Review of Systems   Constitutional:  Negative  for activity change and fever.   HENT:  Negative for hearing loss and trouble swallowing.    Eyes:  Negative for visual disturbance.   Respiratory:  Negative for cough, shortness of breath and wheezing.    Cardiovascular:  Negative for chest pain.   Gastrointestinal:  Negative for abdominal pain, diarrhea, nausea and vomiting.   Endocrine: Negative for polyuria.   Genitourinary:  Negative for bladder incontinence, decreased urine volume, difficulty urinating, discharge, dysuria, enuresis, erectile dysfunction, flank pain, frequency, genital sores, hematuria, penile pain, penile swelling, scrotal swelling, testicular pain and urgency.        BPH with LUTS       Screening PSA       Bladder outlet obstruction       Chronic prostatitis   Musculoskeletal:  Negative for back pain and gait problem.   Integumentary:  Negative for rash.   Neurological:  Negative for speech difficulty and weakness.   Psychiatric/Behavioral:  Negative for behavioral problems and confusion.           Objective     Physical Exam  Vitals and nursing note reviewed.   Constitutional:       General: He is not in acute distress.     Appearance: Normal appearance. He is not ill-appearing, toxic-appearing or diaphoretic.   HENT:      Head: Normocephalic.   Eyes:      Extraocular Movements: Extraocular movements intact.   Cardiovascular:      Rate and Rhythm: Normal rate and regular rhythm.      Heart sounds: Normal heart sounds.   Pulmonary:      Effort: Pulmonary effort is normal. No respiratory distress.      Breath sounds: Normal breath sounds. No wheezing, rhonchi or rales.   Abdominal:      General: Bowel sounds are normal.      Palpations: Abdomen is soft.      Tenderness: There is no abdominal tenderness. There is no right CVA tenderness, left CVA tenderness, guarding or rebound.   Genitourinary:     Rectum: Normal.      Comments: RAFFI: 30 g, smooth, symmetrical, nontender, non indurated  Musculoskeletal:         General: Normal range of motion.       Cervical back: Normal range of motion. No rigidity.   Skin:     General: Skin is warm and dry.   Neurological:      General: No focal deficit present.      Mental Status: He is alert and oriented to person, place, and time.      Motor: No weakness.      Coordination: Coordination normal.      Gait: Gait normal.   Psychiatric:         Mood and Affect: Mood normal.         Behavior: Behavior normal.         Thought Content: Thought content normal.          Assessment and Plan     1. Benign prostatic hyperplasia with urinary obstruction  -     tamsulosin (FLOMAX) 0.4 mg Cap; Take 1 capsule in the morning and 1 capsule at night  Dispense: 180 capsule; Refill: 3  -     finasteride (PROSCAR) 5 mg tablet; Take 1 tablet (5 mg total) by mouth once daily.  Dispense: 90 tablet; Refill: 3    2. Screening PSA (prostate specific antigen)  -     PSA, Screening; Future; Expected date: 04/14/2026    3. Bladder outlet obstruction  -     tamsulosin (FLOMAX) 0.4 mg Cap; Take 1 capsule in the morning and 1 capsule at night  Dispense: 180 capsule; Refill: 3  -     finasteride (PROSCAR) 5 mg tablet; Take 1 tablet (5 mg total) by mouth once daily.  Dispense: 90 tablet; Refill: 3    4. Chronic prostatitis             1. PSA in 1 year  2. Renew and continue tamsulosin (Flomax) take 1 capsule twice a day  3. Renew and continue finasteride (Proscar) 5 mg 1 tablet daily  4. Decrease bladder irritants such as caffeine and carbonated beverages such as sodas  5. Follow-up with urology in 1 year

## 2025-04-14 ENCOUNTER — OFFICE VISIT (OUTPATIENT)
Dept: UROLOGY | Facility: CLINIC | Age: 80
End: 2025-04-14
Payer: MEDICARE

## 2025-04-14 VITALS
SYSTOLIC BLOOD PRESSURE: 152 MMHG | BODY MASS INDEX: 29.26 KG/M2 | HEART RATE: 65 BPM | HEIGHT: 72 IN | WEIGHT: 216 LBS | DIASTOLIC BLOOD PRESSURE: 69 MMHG | OXYGEN SATURATION: 96 % | TEMPERATURE: 98 F

## 2025-04-14 DIAGNOSIS — N41.1 CHRONIC PROSTATITIS: Chronic | ICD-10-CM

## 2025-04-14 DIAGNOSIS — N32.0 BLADDER OUTLET OBSTRUCTION: Chronic | ICD-10-CM

## 2025-04-14 DIAGNOSIS — N13.8 BENIGN PROSTATIC HYPERPLASIA WITH URINARY OBSTRUCTION: Primary | Chronic | ICD-10-CM

## 2025-04-14 DIAGNOSIS — N40.1 BENIGN PROSTATIC HYPERPLASIA WITH URINARY OBSTRUCTION: Primary | Chronic | ICD-10-CM

## 2025-04-14 DIAGNOSIS — Z12.5 SCREENING PSA (PROSTATE SPECIFIC ANTIGEN): ICD-10-CM

## 2025-04-14 PROBLEM — G47.33 OBSTRUCTIVE SLEEP APNEA: Status: ACTIVE | Noted: 2025-04-14

## 2025-04-14 PROCEDURE — 99214 OFFICE O/P EST MOD 30 MIN: CPT | Mod: S$PBB,,, | Performed by: NURSE PRACTITIONER

## 2025-04-14 PROCEDURE — 99215 OFFICE O/P EST HI 40 MIN: CPT | Mod: PBBFAC | Performed by: NURSE PRACTITIONER

## 2025-04-14 PROCEDURE — 99999 PR PBB SHADOW E&M-EST. PATIENT-LVL V: CPT | Mod: PBBFAC,,, | Performed by: NURSE PRACTITIONER

## 2025-04-14 RX ORDER — FINASTERIDE 5 MG/1
5 TABLET, FILM COATED ORAL DAILY
Qty: 90 TABLET | Refills: 3 | Status: SHIPPED | OUTPATIENT
Start: 2025-04-14

## 2025-04-14 RX ORDER — TAMSULOSIN HYDROCHLORIDE 0.4 MG/1
CAPSULE ORAL
Qty: 180 CAPSULE | Refills: 3 | Status: SHIPPED | OUTPATIENT
Start: 2025-04-14

## 2025-04-14 NOTE — PATIENT INSTRUCTIONS
1. PSA in 1 year  2. Renew and continue tamsulosin (Flomax) take 1 capsule twice a day  3. Renew and continue finasteride (Proscar) 5 mg 1 tablet daily  4. Decrease bladder irritants such as caffeine and carbonated beverages such as sodas  5. Follow-up with urology in 1 year

## 2025-04-16 ENCOUNTER — RESULTS FOLLOW-UP (OUTPATIENT)
Dept: UROLOGY | Facility: CLINIC | Age: 80
End: 2025-04-16

## (undated) DEVICE — APPLICATOR CHLORAPREP HI-LITE TINTED ORANGE 26ML

## (undated) DEVICE — GLOVE SURGICAL PROTEXIS PI CLASSIC SIZE 8.5

## (undated) DEVICE — TOURNIQUET CUFF DISP QC 44 INCH

## (undated) DEVICE — SPONGE GAUZE 4X4 12 PLY STL AMD 10/TRAY

## (undated) DEVICE — CDS EXTREMITY

## (undated) DEVICE — GOWN SURGICAL STERILE LEVEL 3 / XX-LARGE

## (undated) DEVICE — GLOVE SURGICAL PROTEXIS PI CLASSIC SIZE 7.0

## (undated) DEVICE — GLOVE SURGICAL PROTEXIS PI CLASSIC SIZE 6.5

## (undated) DEVICE — GLOVE SURGICAL PROTEXIS PI CLASSIC SIZE 8.0

## (undated) DEVICE — GLOVE SURGICAL PROTEXIS PI BLUE SIZE 7.0

## (undated) DEVICE — PADDING CAST SPECIALIST SYNTHETIC 4IN X 4YD STERILE

## (undated) DEVICE — STOCKINETTE 6X48 ORTHO STL 2PY

## (undated) DEVICE — SOL IRRIGATION SALINE 0.9% 1000ML BOTTLE

## (undated) DEVICE — BANDAGE ELASTIC FLEX-MASTER 6INX11YD STL DBL LNGTH

## (undated) DEVICE — GLOVE SURGICAL PROTEXIS PI CLASSIC SIZE 7.5

## (undated) DEVICE — ESMARK BANDAGE 6INX3YD.